# Patient Record
Sex: MALE | Race: WHITE | Employment: OTHER | URBAN - METROPOLITAN AREA
[De-identification: names, ages, dates, MRNs, and addresses within clinical notes are randomized per-mention and may not be internally consistent; named-entity substitution may affect disease eponyms.]

---

## 2022-03-19 PROBLEM — R00.1 BRADYCARDIA: Status: ACTIVE | Noted: 2020-05-18

## 2022-05-24 ENCOUNTER — OFFICE VISIT (OUTPATIENT)
Dept: CARDIOLOGY CLINIC | Age: 87
End: 2022-05-24
Payer: MEDICARE

## 2022-05-24 VITALS
HEIGHT: 74 IN | HEART RATE: 49 BPM | DIASTOLIC BLOOD PRESSURE: 64 MMHG | SYSTOLIC BLOOD PRESSURE: 112 MMHG | WEIGHT: 167 LBS | BODY MASS INDEX: 21.43 KG/M2

## 2022-05-24 DIAGNOSIS — I71.40 ABDOMINAL AORTIC ANEURYSM (AAA) WITHOUT RUPTURE: Primary | ICD-10-CM

## 2022-05-24 DIAGNOSIS — N18.30 CHRONIC RENAL FAILURE, STAGE 3 (MODERATE), UNSPECIFIED WHETHER STAGE 3A OR 3B CKD (HCC): ICD-10-CM

## 2022-05-24 DIAGNOSIS — Z95.1 S/P CABG (CORONARY ARTERY BYPASS GRAFT): ICD-10-CM

## 2022-05-24 DIAGNOSIS — R00.1 BRADYCARDIA: ICD-10-CM

## 2022-05-24 DIAGNOSIS — I49.5 SICK SINUS SYNDROME (HCC): ICD-10-CM

## 2022-05-24 DIAGNOSIS — I70.90 ATHEROSCLEROSIS: ICD-10-CM

## 2022-05-24 DIAGNOSIS — I48.11 LONGSTANDING PERSISTENT ATRIAL FIBRILLATION (HCC): ICD-10-CM

## 2022-05-24 PROCEDURE — G8420 CALC BMI NORM PARAMETERS: HCPCS | Performed by: INTERNAL MEDICINE

## 2022-05-24 PROCEDURE — 93000 ELECTROCARDIOGRAM COMPLETE: CPT | Performed by: INTERNAL MEDICINE

## 2022-05-24 PROCEDURE — G8427 DOCREV CUR MEDS BY ELIG CLIN: HCPCS | Performed by: INTERNAL MEDICINE

## 2022-05-24 PROCEDURE — 99214 OFFICE O/P EST MOD 30 MIN: CPT | Performed by: INTERNAL MEDICINE

## 2022-05-24 ASSESSMENT — ENCOUNTER SYMPTOMS
EYES NEGATIVE: 1
GASTROINTESTINAL NEGATIVE: 1
RESPIRATORY NEGATIVE: 1
ALLERGIC/IMMUNOLOGIC NEGATIVE: 1

## 2022-05-24 NOTE — PROGRESS NOTES
[facility] CARDIOLOGY  [address] [place] [address], [address] [facility], [address] [place] [address]  PHONE: [phone]        22      NAME:  Sharlotte Krabbe  : 1935  MRN: [id number]     Referring Cardiologist: Monae German MD    Reason for Consultation: Atrial fibrillation     ASSESSMENT and PLAN:  Vikram Coronado was seen today for results. Diagnoses and all orders for this visit:    Abdominal aortic aneurysm (HCC)    Longstanding persistent atrial fibrillation (HCC)    Bradycardia    Atherosclerosis    Sick sinus syndrome (HCC)    S/P CABG (coronary artery bypass graft)    Chronic renal failure, stage 3 (moderate), unspecified whether stage 3a or 3b CKD ([facility]    81year old male with permanent AF with bradycardia. He has documented SSS and chronotropic incompetence. He has mild symptoms. He has a class 1 indication for a PPM. He does have a normal EF. We discussed he would be a good candidate for a SC PPM and either a transvenous or MICRA (leadless PPM) would effective. I would favor a leadless device given his reduced risk of longterm infection. He's also had a CABG in the past which does reduce procedural complications from MICRA implantation.     -Symptomatic bradycardia - considering PPM implant. Avoid AV node blockers.  -Persistent AF - continue Eliquis. -EP follow up in 6 months or PRN. -Routine cardiac care per Dr. Israel Lang. Patient has been instructed and agrees to call our office with any issues or other concerns related to their cardiac condition(s) and/or complaint(s). Thank you for allowing me to participate in the electrophysiologic care of Mr. Sharlotte Krabbe. Please contact me if any questions or concerns were to arise. Sara Loomis MD, MS  Clinical Cardiac Electrophysiology  [facility] Cardiology  22  1:04 PM    ===================================================================  Chief Complant:    Chief Complaint   Patient presents with    Results     Echo         Consultation is requested by Angelica Wilson MD for evaluation of Results (Echo )      History:  Rosalind Lowe is a most pleasant 81 y.o. male with a past medical and cardiac history significant for bradycardia and CAD s/p CABG. He is referred by Dr. Carina Mireles. He is known to have bradyardia and describes mild symptoms. He wore a monitor which demonstrated multiple pauses and chronotropic incompetence. The patient otherwise denies chest pain, dyspnea, presyncope, syncope or lateralizing symptoms. Cardiac PMH: (Old records have been reviewed and summarized below)    EKG:  (EKG has been independently visualized by me with interpretation below): Atrial fibrillation, bradycardia, nonspecific QRS widening, normal axis. Monitor: Ziopatch, bradycardia, persistent AF. Multiple 3 second pause episodes. Evidence for chronotropic incompetence. ECHO: 3/2022    Left Ventricle: Left ventricle size is normal. Normal wall thickness. Normal wall motion. Normal left ventricular systolic function. EF by 2D Simpsons Biplane is 62%.   Aortic Valve: Mild sclerosis of the aortic valve cusp. Mild transvalvular regurgitation.   Aorta: Mildly dilated ascending aorta at 3.8 cm.   Bradycardia noted throughout procedure, electrophysiology consultation has been arranged. Previous Heart Catheterization: n/a     Stress Test: n/a     DEVICE INTERROGATION: n/a     Past Medical History, Past Surgical History, Family history, Social History, and Medications were all reviewed with the patient today and updated as necessary. Current Outpatient Medications   Medication Sig Dispense Refill    apixaban (ELIQUIS) 2.5 MG TABS tablet TAKE 1 TAB BY MOUTH TWO (2) TIMES A DAY.       vitamin D (CHOLECALCIFEROL) 25 MCG (1000 UT) TABS tablet Take 1,000 Units by mouth daily      ferrous sulfate (IRON 325) 325 (65 Fe) MG tablet Take by mouth every morning (before breakfast)      fluticasone (FLONASE) 50 MCG/ACT nasal spray 2 sprays by Nasal route daily      simvastatin (ZOCOR) 40 MG tablet Take 40 mg by mouth       No current facility-administered medications for this visit. No Known Allergies    Past Medical History:   Diagnosis Date    Abdominal aortic aneurysm ([facility] 7/25/2016    Angina pectoris ([facility] 7/25/2016    Atherosclerosis 7/25/2016    Atrial fibrillation ([facility] 7/25/2016    Bradycardia 5/18/2020    Chronic renal failure 7/25/2016    Hyperlipidemia 7/25/2016    S/P CABG (coronary artery bypass graft) 7/25/2016    Sick sinus syndrome ([facility] 8/8/2016     Social History     Tobacco Use    Smoking status: Former Smoker    Smokeless tobacco: Never Used   Substance Use Topics    Alcohol use: No       ROS:  A comprehensive review of systems was performed with the pertinent positives and negatives as noted in the HPI in addition to:  Review of Systems   Constitutional: Negative. HENT: Negative. Eyes: Negative. Respiratory: Negative. Cardiovascular: Negative. Gastrointestinal: Negative. Endocrine: Negative. Genitourinary: Negative. Musculoskeletal: Negative. Skin: Negative. Allergic/Immunologic: Negative. Neurological: Negative. Hematological: Negative. Psychiatric/Behavioral: Negative. All other systems reviewed and are negative. PHYSICAL EXAM:   /64   Pulse (!) 49 Comment: per EKG  Ht 6' 2\" (1.88 m)   Wt 167 lb (75.8 kg)   BMI 21.44 kg/m²      Wt Readings from Last 3 Encounters:   05/24/22 167 lb (75.8 kg)   03/11/22 172 lb (78 kg)   02/16/22 172 lb (78 kg)     BP Readings from Last 3 Encounters:   05/24/22 112/64   03/11/22 (!) 102/54   02/16/22 118/62     Gen: Well appearing, well developed, no acute distress  Eyes: Pupils equal, round.  Extraocular movements are intact  ENT: Oropharynx clear, no oral lesions, normal dentition  CV: S1S2, IRIR, bradycardia, no murmurs, rubs or gallops, normal JVD, no carotid bruits, normal distal pulses, no MATHEW  Pulm: Clear to auscultation bilaterally, no accessory muscle uses, no wheezes or rales  GI: Soft, NT, ND, +BS  Neuro: Alert and oriented, nonfocal  Psych: Appropriate affect  Skin: Normal color and skin turgor  MSK: Normal muscle bulk and tone    Medical problems and test results were reviewed with the patient today. No results found for any visits on 05/24/22.

## 2022-05-24 NOTE — PATIENT INSTRUCTIONS
Patient Education        Learning About a Leadless Pacemaker  What is a leadless pacemaker? A leadless pacemaker is a small, battery-powered device. It sends mild electrical signals to your heart. This keeps the heart beating normally. These signals are painless. The pacemaker can help stop the dizziness, fainting, andshortness of breath caused by a slow heart rate. A leadless pacemaker is a newer type of pacemaker. It's called leadless because no wires connect it to the heart. It's placed in the heart's right ventricle, which is one of the heart's lower chambers. Other kinds of pacemakers are placed under the skin of the chest. Those types are connected to the heart bywires. You may feel worried about having a pacemaker. This is common. It can help if you learn about how the pacemaker helps your heart. Talk to your doctor if youhave concerns. How is it put in place? You will get medicine before the procedure. It helps you relax and helpsprevent pain. Your doctor doesn't need to make any cuts to do the procedure. Instead, the doctor uses a thin tube called a catheter. The pacemaker is placed inside the catheter. The doctor puts the catheter into a blood vessel in your groin. Beth Gonzales get a shot to numb the skin where the catheter goes in. Then the doctor moves the catheter through the blood vessel to the right ventricle of your heart. You may feel pressure when the doctor does this. Your doctor may also have injected a dye into your blood vessel and heart. The dye shows up on a screen. It helps your doctor see where to move the catheter andpacemaker. When the catheter is inside the right ventricle, the doctor moves the pacemaker out of the catheter. The pacemaker is attached to the heart tissue so that it doesn't move. Flexible hooks may be used. Then the catheter is removed fromyour body. What can you expect? A leadless pacemaker can help you return to a more normal, more active life.   You'll need to use certain electric devices with caution. Some devices have a strong electromagnetic field. This field can keep your pacemaker from working right for a short time. These devices include things in your home, garage, or workplace. Check with your doctor about what you need to avoid and what you need to keep a short distance away from your pacemaker. Many household andoffice electronics do not affect your pacemaker. Your doctor will tell you how often your pacemaker should be checked. This isto make sure that it's working right. The battery life of a leadless pacemaker is up to 10 years or more. Its strength will be checked at follow-up visits. The battery can't be recharged. You will need a new pacemaker when the charge is too low. Follow-up care is a key part of your treatment and safety. Be sure to make and go to all appointments, and call your doctor if you are having problems. It's also a good idea to know your test results and keep alist of the medicines you take. Current as of: January 10, 2022               Content Version: 13.2  © 2006-2022 [employer], [facility]. Care instructions adapted under license by [facility] ([facility]). If you have questions about a medical condition or this instruction, always ask your healthcare professional. [place] [address] any warranty or liability for your use of this information.

## 2022-08-22 ENCOUNTER — OFFICE VISIT (OUTPATIENT)
Dept: CARDIOLOGY CLINIC | Age: 87
End: 2022-08-22
Payer: MEDICARE

## 2022-08-22 VITALS
HEART RATE: 50 BPM | BODY MASS INDEX: 21.92 KG/M2 | DIASTOLIC BLOOD PRESSURE: 64 MMHG | WEIGHT: 170.8 LBS | HEIGHT: 74 IN | SYSTOLIC BLOOD PRESSURE: 120 MMHG

## 2022-08-22 DIAGNOSIS — R00.1 BRADYCARDIA: ICD-10-CM

## 2022-08-22 DIAGNOSIS — I48.21 PERMANENT ATRIAL FIBRILLATION (HCC): Primary | ICD-10-CM

## 2022-08-22 DIAGNOSIS — Z95.1 S/P CABG (CORONARY ARTERY BYPASS GRAFT): ICD-10-CM

## 2022-08-22 PROCEDURE — G8420 CALC BMI NORM PARAMETERS: HCPCS | Performed by: INTERNAL MEDICINE

## 2022-08-22 PROCEDURE — 99214 OFFICE O/P EST MOD 30 MIN: CPT | Performed by: INTERNAL MEDICINE

## 2022-08-22 PROCEDURE — 1123F ACP DISCUSS/DSCN MKR DOCD: CPT | Performed by: INTERNAL MEDICINE

## 2022-08-22 PROCEDURE — 1036F TOBACCO NON-USER: CPT | Performed by: INTERNAL MEDICINE

## 2022-08-22 PROCEDURE — G8427 DOCREV CUR MEDS BY ELIG CLIN: HCPCS | Performed by: INTERNAL MEDICINE

## 2022-08-22 ASSESSMENT — ENCOUNTER SYMPTOMS
COUGH: 0
ORTHOPNEA: 0
SHORTNESS OF BREATH: 0
WHEEZING: 0
HEMATOCHEZIA: 0
HEMATEMESIS: 0
NAUSEA: 0
VOMITING: 0
COLOR CHANGE: 0
ABDOMINAL PAIN: 0
DIARRHEA: 0
BOWEL INCONTINENCE: 0
BLURRED VISION: 0
SPUTUM PRODUCTION: 0
HOARSE VOICE: 0

## 2022-08-22 NOTE — PROGRESS NOTES
[facility] CARDIOLOGY  [address], [address] [facility], [address] [place] [address]  PHONE: [phone]        22        NAME:  Loli Garvin  : 1935  MRN: [id number]       SUBJECTIVE:   Loli Garvin is a 81 y.o. male seen for a follow up visit regarding the following: SSS,AF,CAD,and CABG. He was seen by EP due to chronotropic incompetency and PPM was recommended. He did not undergo pacemaker implant. He returns for scheduled follow up and reports doing well. Chief Complaint   Patient presents with    Atrial Fibrillation     6 month follow up       HPI:    Palpitations   This is a chronic problem. The problem occurs rarely. The problem has been unchanged. Nothing aggravates the symptoms. Pertinent negatives include no anxiety, chest fullness, chest pain, coughing, diaphoresis, dizziness, fever, irregular heartbeat, malaise/fatigue, nausea, near-syncope, numbness, shortness of breath, syncope, vomiting or weakness. Past Medical History, Past Surgical History, Family history, Social History, and Medications were all reviewed with the patient today and updated as necessary.          Current Outpatient Medications:     apixaban (ELIQUIS) 2.5 MG TABS tablet, TAKE 1 TAB BY MOUTH TWO (2) TIMES A DAY., Disp: , Rfl:     vitamin D (CHOLECALCIFEROL) 25 MCG (1000 UT) TABS tablet, Take 1,000 Units by mouth daily, Disp: , Rfl:     ferrous sulfate (IRON 325) 325 (65 Fe) MG tablet, Take by mouth every morning (before breakfast), Disp: , Rfl:     fluticasone (FLONASE) 50 MCG/ACT nasal spray, 2 sprays by Nasal route daily, Disp: , Rfl:     simvastatin (ZOCOR) 40 MG tablet, Take 40 mg by mouth, Disp: , Rfl:   No Known Allergies  Past Medical History:   Diagnosis Date    Abdominal aortic aneurysm ([facility] 2016    Angina pectoris ([facility] 2016    Atherosclerosis 2016    Atrial fibrillation ([facility] 2016    Bradycardia 2020    Chronic renal failure 2016    Hyperlipidemia 2016    S/P CABG (coronary artery bypass graft) 7/25/2016    Sick sinus syndrome ([facility] 8/8/2016     No past surgical history on file. No family history on file. Social History     Tobacco Use    Smoking status: Former    Smokeless tobacco: Never   Substance Use Topics    Alcohol use: No       ROS:    Review of Systems   Constitutional: Negative for chills, decreased appetite, diaphoresis, fever and malaise/fatigue. HENT:  Negative for congestion, hearing loss, hoarse voice and nosebleeds. Eyes:  Negative for blurred vision. Cardiovascular:  Positive for palpitations. Negative for chest pain, claudication, cyanosis, dyspnea on exertion, irregular heartbeat, leg swelling, near-syncope, orthopnea, paroxysmal nocturnal dyspnea and syncope. Respiratory:  Negative for cough, shortness of breath, sputum production and wheezing. Endocrine: Negative for polydipsia, polyphagia and polyuria. Skin:  Negative for color change. Gastrointestinal:  Negative for abdominal pain, bowel incontinence, diarrhea, hematemesis, hematochezia, nausea and vomiting. Genitourinary:  Negative for dysuria, frequency and hematuria. Neurological:  Negative for dizziness, focal weakness, light-headedness, loss of balance, numbness, sensory change and weakness. Psychiatric/Behavioral:  Negative for altered mental status and memory loss. The patient is not nervous/anxious. PHYSICAL EXAM:   /64   Pulse 50   Ht 6' 2\" (1.88 m)   Wt 170 lb 12.8 oz (77.5 kg)   BMI 21.93 kg/m²      Physical Exam  Constitutional:       Appearance: Normal appearance. HENT:      Head: Normocephalic and atraumatic. Nose: Nose normal.   Eyes:      Extraocular Movements: Extraocular movements intact. Pupils: Pupils are equal, round, and reactive to light. Neck:      Vascular: No carotid bruit. Cardiovascular:      Rate and Rhythm: Rhythm irregular. Pulses: Normal pulses. Heart sounds: No murmur heard.   Pulmonary:      Effort: Pulmonary effort is normal.      Breath sounds: Normal breath sounds. Abdominal:      General: Abdomen is flat. Bowel sounds are normal.      Palpations: Abdomen is soft. Musculoskeletal:         General: Normal range of motion. Cervical back: Normal range of motion and neck supple. Skin:     General: Skin is warm and dry. Neurological:      General: No focal deficit present. Mental Status: He is alert and oriented to person, place, and time. Psychiatric:         Mood and Affect: Mood normal.       Medical problems and test results were reviewed with the patient today. No results found for this or any previous visit (from the past 672 hour(s)). No results found for: CHOL, CHOLPOCT, CHOLX, CHLST, CHOLV, HDL, HDLPOC, HDLC, LDL, LDLC, VLDLC, VLDL, TGLX, TRIGL  No results found for any visits on 08/22/22. ASSESSMENT and PLAN    Ace Curry was seen today for atrial fibrillation. Diagnoses and all orders for this visit:    Permanent atrial fibrillation (HCC):Stable. Continue Eliquis. Bradycardia:Asymptomatic. S/P CABG (coronary artery bypass graft):No angina. Continue Zocor. Avoiding BB due to SSS. Disposition:    Return in about 6 months (around 2/22/2023).                 Cookie Maloney MD  8/22/2022  11:41 AM

## 2023-01-09 ENCOUNTER — OFFICE VISIT (OUTPATIENT)
Dept: CARDIOLOGY CLINIC | Age: 88
End: 2023-01-09
Payer: MEDICARE

## 2023-01-09 VITALS
HEART RATE: 51 BPM | BODY MASS INDEX: 22.64 KG/M2 | DIASTOLIC BLOOD PRESSURE: 69 MMHG | WEIGHT: 176.4 LBS | HEIGHT: 74 IN | SYSTOLIC BLOOD PRESSURE: 127 MMHG

## 2023-01-09 DIAGNOSIS — I48.11 LONGSTANDING PERSISTENT ATRIAL FIBRILLATION (HCC): Primary | ICD-10-CM

## 2023-01-09 DIAGNOSIS — I20.9 ANGINA PECTORIS (HCC): ICD-10-CM

## 2023-01-09 PROCEDURE — 93000 ELECTROCARDIOGRAM COMPLETE: CPT | Performed by: INTERNAL MEDICINE

## 2023-01-09 PROCEDURE — G8420 CALC BMI NORM PARAMETERS: HCPCS | Performed by: INTERNAL MEDICINE

## 2023-01-09 PROCEDURE — G8484 FLU IMMUNIZE NO ADMIN: HCPCS | Performed by: INTERNAL MEDICINE

## 2023-01-09 PROCEDURE — 1123F ACP DISCUSS/DSCN MKR DOCD: CPT | Performed by: INTERNAL MEDICINE

## 2023-01-09 PROCEDURE — 99214 OFFICE O/P EST MOD 30 MIN: CPT | Performed by: INTERNAL MEDICINE

## 2023-01-09 PROCEDURE — G8427 DOCREV CUR MEDS BY ELIG CLIN: HCPCS | Performed by: INTERNAL MEDICINE

## 2023-01-09 PROCEDURE — 1036F TOBACCO NON-USER: CPT | Performed by: INTERNAL MEDICINE

## 2023-01-09 ASSESSMENT — ENCOUNTER SYMPTOMS
ALLERGIC/IMMUNOLOGIC NEGATIVE: 1
RESPIRATORY NEGATIVE: 1
EYES NEGATIVE: 1
GASTROINTESTINAL NEGATIVE: 1

## 2023-01-09 NOTE — PROGRESS NOTES
[facility] CARDIOLOGY  [address] [place] [address], [address] [facility], [address] [place] [address]  PHONE: [phone]        23      NAME:  Cruz Venegas  : 1935  MRN: [id number]     Referring Cardiologist: Wayne Lao MD    Reason for Consultation: Atrial fibrillation     ASSESSMENT and PLAN:  Massimo Moreno was seen today for results. Diagnoses and all orders for this visit:    Abdominal aortic aneurysm (HCC)    Longstanding persistent atrial fibrillation (HCC)    Bradycardia    Atherosclerosis    Sick sinus syndrome (HCC)    S/P CABG (coronary artery bypass graft)    Chronic renal failure, stage 3 (moderate), unspecified whether stage 3a or 3b CKD ([facility]    81year old male with permanent AF with bradycardia. He has documented SSS and chronotropic incompetence. He has mild symptoms. He has a class 1 indication for a PPM. He does have a normal EF. We discussed he would be a good candidate for a SC PPM and either a transvenous or MICRA (leadless PPM) would effective. I would favor a leadless device given his reduced risk of longterm infection. He's also had a CABG in the past which does reduce procedural complications from MICRA implantation.     -Symptomatic bradycardia - considering PPM implant. Avoid AV node blockers.  -Persistent AF - continue Eliquis. -EP follow up in 6 months or PRN. -Routine cardiac care per Dr. Ottoniel Hendrix. Patient has been instructed and agrees to call our office with any issues or other concerns related to their cardiac condition(s) and/or complaint(s). Thank you for allowing me to participate in the electrophysiologic care of Mr. Cruz Venegas. Please contact me if any questions or concerns were to arise. Zack Loomis MD, MS  Clinical Cardiac Electrophysiology  [facility] Cardiology  23  8:36 AM    ===================================================================  Chief Complant:    Chief Complaint   Patient presents with    Irregular Heart Beat    Hyperlipidemia    Atrial Fibrillation        Consultation is requested by Yolanda Klinefelter, MD for evaluation of Irregular Heart Beat, Hyperlipidemia, and Atrial Fibrillation    History:  Kwadwo Dunbar is a most pleasant 81 y.o. male with a past medical and cardiac history significant for bradycardia and CAD s/p CABG. He is referred by Dr. Nivia Mcnair. He is known to have bradyardia and describes mild symptoms. He wore a monitor which demonstrated multiple pauses and chronotropic incompetence. He comes in for follow up. He's mostly felt well without significant CV symptoms. The patient otherwise denies chest pain, dyspnea, presyncope, syncope or lateralizing symptoms. Cardiac PMH: (Old records have been reviewed and summarized below)    EKG:  (EKG has been independently visualized by me with interpretation below): Atrial fibrillation, bradycardia, nonspecific QRS widening, normal axis. Monitor: Ziopatch, bradycardia, persistent AF. Multiple 3 second pause episodes. Evidence for chronotropic incompetence. ECHO: 3/2022    Left Ventricle: Left ventricle size is normal. Normal wall thickness. Normal wall motion. Normal left ventricular systolic function. EF by 2D Simpsons Biplane is 62%. Aortic Valve: Mild sclerosis of the aortic valve cusp. Mild transvalvular regurgitation. Aorta: Mildly dilated ascending aorta at 3.8 cm. Bradycardia noted throughout procedure, electrophysiology consultation has been arranged. Previous Heart Catheterization: n/a     Stress Test: n/a     DEVICE INTERROGATION: n/a     Past Medical History, Past Surgical History, Family history, Social History, and Medications were all reviewed with the patient today and updated as necessary. Current Outpatient Medications   Medication Sig Dispense Refill    Multiple Vitamin (MULTIVITAMIN ADULT PO) Take by mouth      apixaban (ELIQUIS) 2.5 MG TABS tablet TAKE 1 TAB BY MOUTH TWO (2) TIMES A DAY.       vitamin D (CHOLECALCIFEROL) 25 MCG (1000 UT) TABS tablet Take 1,000 Units by mouth daily      ferrous sulfate (IRON 325) 325 (65 Fe) MG tablet Take by mouth every morning (before breakfast)      fluticasone (FLONASE) 50 MCG/ACT nasal spray 2 sprays by Nasal route daily      simvastatin (ZOCOR) 40 MG tablet Take 40 mg by mouth       No current facility-administered medications for this visit. No Known Allergies    Past Medical History:   Diagnosis Date    Abdominal aortic aneurysm 7/25/2016    [facility] 7/25/2016    Atherosclerosis 7/25/2016    Atrial fibrillation ([facility] 7/25/2016    Bradycardia 5/18/2020    Chronic renal failure 7/25/2016    Hyperlipidemia 7/25/2016    S/P CABG (coronary artery bypass graft) 7/25/2016    Sick sinus syndrome ([facility] 8/8/2016     Social History     Tobacco Use    Smoking status: Former    Smokeless tobacco: Never   Substance Use Topics    Alcohol use: No       ROS:  A comprehensive review of systems was performed with the pertinent positives and negatives as noted in the HPI in addition to:  Review of Systems   Constitutional: Negative. HENT: Negative. Eyes: Negative. Respiratory: Negative. Cardiovascular: Negative. Gastrointestinal: Negative. Endocrine: Negative. Genitourinary: Negative. Musculoskeletal: Negative. Skin: Negative. Allergic/Immunologic: Negative. Neurological: Negative. Hematological: Negative. Psychiatric/Behavioral: Negative. All other systems reviewed and are negative. PHYSICAL EXAM:   /69   Pulse 51   Ht 6' 2\" (1.88 m)   Wt 176 lb 6.4 oz (80 kg)   BMI 22.65 kg/m²      Wt Readings from Last 3 Encounters:   01/09/23 176 lb 6.4 oz (80 kg)   08/22/22 170 lb 12.8 oz (77.5 kg)   05/24/22 167 lb (75.8 kg)     BP Readings from Last 3 Encounters:   01/09/23 127/69   08/22/22 120/64   05/24/22 112/64     Gen: Well appearing, well developed, no acute distress  Eyes: Pupils equal, round.  Extraocular movements are intact  ENT: Oropharynx clear, no oral lesions, normal dentition  CV: S1S2, IRIR, bradycardia, no murmurs, rubs or gallops, normal JVD, no carotid bruits, normal distal pulses, no MATHEW  Pulm: Clear to auscultation bilaterally, no accessory muscle uses, no wheezes or rales  GI: Soft, NT, ND, +BS  Neuro: Alert and oriented, nonfocal  Psych: Appropriate affect  Skin: Normal color and skin turgor  MSK: Normal muscle bulk and tone    Medical problems and test results were reviewed with the patient today. No results found for any visits on 01/09/23.

## 2023-02-13 ENCOUNTER — OFFICE VISIT (OUTPATIENT)
Dept: CARDIOLOGY CLINIC | Age: 88
End: 2023-02-13
Payer: MEDICARE

## 2023-02-13 VITALS
DIASTOLIC BLOOD PRESSURE: 56 MMHG | HEIGHT: 74 IN | BODY MASS INDEX: 22.59 KG/M2 | HEART RATE: 56 BPM | WEIGHT: 176 LBS | SYSTOLIC BLOOD PRESSURE: 108 MMHG

## 2023-02-13 DIAGNOSIS — I48.21 PERMANENT ATRIAL FIBRILLATION (HCC): ICD-10-CM

## 2023-02-13 DIAGNOSIS — Z95.1 S/P CABG (CORONARY ARTERY BYPASS GRAFT): ICD-10-CM

## 2023-02-13 DIAGNOSIS — I49.5 SICK SINUS SYNDROME (HCC): Primary | ICD-10-CM

## 2023-02-13 DIAGNOSIS — E78.5 HYPERLIPIDEMIA, UNSPECIFIED HYPERLIPIDEMIA TYPE: ICD-10-CM

## 2023-02-13 PROCEDURE — 1123F ACP DISCUSS/DSCN MKR DOCD: CPT | Performed by: INTERNAL MEDICINE

## 2023-02-13 PROCEDURE — G8420 CALC BMI NORM PARAMETERS: HCPCS | Performed by: INTERNAL MEDICINE

## 2023-02-13 PROCEDURE — G8427 DOCREV CUR MEDS BY ELIG CLIN: HCPCS | Performed by: INTERNAL MEDICINE

## 2023-02-13 PROCEDURE — 1036F TOBACCO NON-USER: CPT | Performed by: INTERNAL MEDICINE

## 2023-02-13 PROCEDURE — 99214 OFFICE O/P EST MOD 30 MIN: CPT | Performed by: INTERNAL MEDICINE

## 2023-02-13 PROCEDURE — G8484 FLU IMMUNIZE NO ADMIN: HCPCS | Performed by: INTERNAL MEDICINE

## 2023-02-13 RX ORDER — SIMVASTATIN 40 MG
40 TABLET ORAL NIGHTLY
Qty: 90 TABLET | Refills: 3 | Status: SHIPPED | OUTPATIENT
Start: 2023-02-13

## 2023-02-13 ASSESSMENT — ENCOUNTER SYMPTOMS
ABDOMINAL PAIN: 0
WHEEZING: 0
SPUTUM PRODUCTION: 0
DIARRHEA: 0
BOWEL INCONTINENCE: 0
HEMATEMESIS: 0
HEMATOCHEZIA: 0
SHORTNESS OF BREATH: 0
ORTHOPNEA: 0
COLOR CHANGE: 0
BLURRED VISION: 0
HOARSE VOICE: 0

## 2023-02-13 NOTE — PROGRESS NOTES
[address], [address]  PHONE: [phone]        23        NAME:  Jett Olvera  : 1935  MRN: [id number]       SUBJECTIVE:   Jett Olvera is a 81 y.o. male seen for a follow up visit regarding the following: SSS,AF,CAD, chronotropic incompetence,and hx CABG. He returns for scheduled follow up. In the past,he has declined recommended pacemaker. He continues to feel well with no complaints. Chief Complaint   Patient presents with    Atrial Fibrillation     6 month follow up       HPI:    Atrial Fibrillation  Presents for follow-up visit. Symptoms include bradycardia. Symptoms are negative for an AICD problem, chest pain, dizziness, hemodynamic instability, hypertension, hypotension, pacemaker problem, palpitations, shortness of breath, syncope and weakness. The symptoms have been stable. Past Medical History, Past Surgical History, Family history, Social History, and Medications were all reviewed with the patient today and updated as necessary.          Current Outpatient Medications:     apixaban (ELIQUIS) 2.5 MG TABS tablet, Take 1 tablet by mouth 2 times daily TAKE 1 TAB BY MOUTH TWO (2) TIMES A DAY., Disp: 180 tablet, Rfl: 3    simvastatin (ZOCOR) 40 MG tablet, Take 1 tablet by mouth nightly, Disp: 90 tablet, Rfl: 3    Multiple Vitamin (MULTIVITAMIN ADULT PO), Take by mouth, Disp: , Rfl:     vitamin D (CHOLECALCIFEROL) 25 MCG (1000 UT) TABS tablet, Take 1,000 Units by mouth daily, Disp: , Rfl:     ferrous sulfate (IRON 325) 325 (65 Fe) MG tablet, Take by mouth every morning (before breakfast), Disp: , Rfl:     fluticasone (FLONASE) 50 MCG/ACT nasal spray, 2 sprays by Nasal route daily, Disp: , Rfl:   No Known Allergies  Past Medical History:   Diagnosis Date    Abdominal aortic aneurysm 2016    Angina pectoris ([facility] 2016    Atherosclerosis 2016    Atrial fibrillation ([facility] 2016    Bradycardia 2020    Chronic renal failure 7/25/2016    Hyperlipidemia 7/25/2016    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  No family history on file.   Social History     Tobacco Use    Smoking status: Former    Smokeless tobacco: Never   Substance Use Topics    Alcohol use: No       ROS:    Review of Systems   Constitutional: Negative for chills, decreased appetite, diaphoresis, fever and malaise/fatigue.   HENT:  Negative for congestion, hearing loss, hoarse voice and nosebleeds.    Eyes:  Negative for blurred vision.   Cardiovascular:  Negative for chest pain, claudication, cyanosis, dyspnea on exertion, irregular heartbeat, leg swelling, near-syncope, orthopnea, palpitations, paroxysmal nocturnal dyspnea and syncope.   Respiratory:  Negative for shortness of breath, sputum production and wheezing.    Endocrine: Negative for polydipsia, polyphagia and polyuria.   Skin:  Negative for color change.   Gastrointestinal:  Negative for abdominal pain, bowel incontinence, diarrhea, hematemesis and hematochezia.   Genitourinary:  Negative for dysuria, frequency and hematuria.   Neurological:  Negative for dizziness, focal weakness, light-headedness, loss of balance, numbness, sensory change and weakness.   Psychiatric/Behavioral:  Negative for altered mental status and memory loss.          PHYSICAL EXAM:   BP (!) 108/56   Pulse 56   Ht 6' 2\" (1.88 m)   Wt 176 lb (79.8 kg)   BMI 22.60 kg/m²      Physical Exam  Constitutional:       Appearance: Normal appearance.   HENT:      Head: Normocephalic and atraumatic.      Nose: Nose normal.   Eyes:      Extraocular Movements: Extraocular movements intact.      Pupils: Pupils are equal, round, and reactive to light.   Neck:      Vascular: No carotid bruit.   Cardiovascular:      Rate and Rhythm: Rhythm irregular.      Pulses: Normal pulses.      Heart sounds: No murmur heard.  Pulmonary:      Effort: Pulmonary effort is  normal.      Breath sounds: Normal breath sounds. Abdominal:      General: Abdomen is flat. Bowel sounds are normal.      Palpations: Abdomen is soft. Musculoskeletal:         General: Normal range of motion. Cervical back: Normal range of motion and neck supple. Skin:     General: Skin is warm and dry. Neurological:      General: No focal deficit present. Mental Status: He is alert and oriented to person, place, and time. Psychiatric:         Mood and Affect: Mood normal.       Medical problems and test results were reviewed with the patient today. No results found for this or any previous visit (from the past 672 hour(s)). No results found for: CHOL, CHOLPOCT, CHOLX, CHLST, CHOLV, HDL, HDLPOC, HDLC, LDL, LDLC, VLDLC, VLDL, TGLX, TRIGL  No results found for any visits on 02/13/23. ASSESSMENT and PLAN    Ingrid Dubin was seen today for atrial fibrillation. Diagnoses and all orders for this visit:    Sick sinus syndrome (HCC):He continues to decline pacemaker. Permanent atrial fibrillation (HCC):Stable. Continue NOAC. -     apixaban (ELIQUIS) 2.5 MG TABS tablet; Take 1 tablet by mouth 2 times daily TAKE 1 TAB BY MOUTH TWO (2) TIMES A DAY. Hyperlipidemia, unspecified hyperlipidemia type:Stable. Continue Zocor. Lipid panel in 6 months. Lipid panel on 10-26-22 reported ND=[id number] with an LDL=73 which is close to goal.  -     simvastatin (ZOCOR) 40 MG tablet; Take 1 tablet by mouth nightly    S/P CABG (coronary artery bypass graft):Asymptomatic. Avoiding BB due to bradycardia. Continue statin. Disposition:    Return in about 6 months (around 8/13/2023).                 Ilsa Kendall MD  2/13/2023  11:12 AM

## 2023-08-17 ENCOUNTER — OFFICE VISIT (OUTPATIENT)
Age: 88
End: 2023-08-17
Payer: MEDICARE

## 2023-08-17 VITALS
HEIGHT: 74 IN | SYSTOLIC BLOOD PRESSURE: 98 MMHG | BODY MASS INDEX: 23.2 KG/M2 | WEIGHT: 180.8 LBS | HEART RATE: 48 BPM | DIASTOLIC BLOOD PRESSURE: 50 MMHG

## 2023-08-17 DIAGNOSIS — E78.5 HYPERLIPIDEMIA, UNSPECIFIED HYPERLIPIDEMIA TYPE: ICD-10-CM

## 2023-08-17 DIAGNOSIS — I65.23 BILATERAL CAROTID ARTERY STENOSIS: Primary | ICD-10-CM

## 2023-08-17 DIAGNOSIS — I48.21 PERMANENT ATRIAL FIBRILLATION (HCC): ICD-10-CM

## 2023-08-17 PROCEDURE — 1123F ACP DISCUSS/DSCN MKR DOCD: CPT | Performed by: INTERNAL MEDICINE

## 2023-08-17 PROCEDURE — 99214 OFFICE O/P EST MOD 30 MIN: CPT | Performed by: INTERNAL MEDICINE

## 2023-08-17 PROCEDURE — 1036F TOBACCO NON-USER: CPT | Performed by: INTERNAL MEDICINE

## 2023-08-17 PROCEDURE — G8420 CALC BMI NORM PARAMETERS: HCPCS | Performed by: INTERNAL MEDICINE

## 2023-08-17 PROCEDURE — G8427 DOCREV CUR MEDS BY ELIG CLIN: HCPCS | Performed by: INTERNAL MEDICINE

## 2023-08-17 RX ORDER — SIMVASTATIN 40 MG
40 TABLET ORAL NIGHTLY
Qty: 90 TABLET | Refills: 3 | Status: SHIPPED | OUTPATIENT
Start: 2023-08-17

## 2023-08-17 ASSESSMENT — ENCOUNTER SYMPTOMS
WHEEZING: 0
COLOR CHANGE: 0
DIARRHEA: 0
ABDOMINAL PAIN: 0
BOWEL INCONTINENCE: 0
ORTHOPNEA: 0
SHORTNESS OF BREATH: 0
BLURRED VISION: 0
HEMATEMESIS: 0
HOARSE VOICE: 0
HEMATOCHEZIA: 0
SPUTUM PRODUCTION: 0

## 2024-02-29 ENCOUNTER — OFFICE VISIT (OUTPATIENT)
Age: 89
End: 2024-02-29
Payer: MEDICARE

## 2024-02-29 VITALS
HEART RATE: 38 BPM | SYSTOLIC BLOOD PRESSURE: 138 MMHG | HEIGHT: 74 IN | BODY MASS INDEX: 23.87 KG/M2 | DIASTOLIC BLOOD PRESSURE: 60 MMHG | WEIGHT: 186 LBS

## 2024-02-29 DIAGNOSIS — I49.5 SICK SINUS SYNDROME (HCC): ICD-10-CM

## 2024-02-29 DIAGNOSIS — I65.23 BILATERAL CAROTID ARTERY STENOSIS: ICD-10-CM

## 2024-02-29 DIAGNOSIS — I48.21 PERMANENT ATRIAL FIBRILLATION (HCC): Primary | ICD-10-CM

## 2024-02-29 PROCEDURE — G8420 CALC BMI NORM PARAMETERS: HCPCS | Performed by: INTERNAL MEDICINE

## 2024-02-29 PROCEDURE — 1036F TOBACCO NON-USER: CPT | Performed by: INTERNAL MEDICINE

## 2024-02-29 PROCEDURE — G8427 DOCREV CUR MEDS BY ELIG CLIN: HCPCS | Performed by: INTERNAL MEDICINE

## 2024-02-29 PROCEDURE — G8484 FLU IMMUNIZE NO ADMIN: HCPCS | Performed by: INTERNAL MEDICINE

## 2024-02-29 PROCEDURE — 93000 ELECTROCARDIOGRAM COMPLETE: CPT | Performed by: INTERNAL MEDICINE

## 2024-02-29 PROCEDURE — 1123F ACP DISCUSS/DSCN MKR DOCD: CPT | Performed by: INTERNAL MEDICINE

## 2024-02-29 PROCEDURE — 99214 OFFICE O/P EST MOD 30 MIN: CPT | Performed by: INTERNAL MEDICINE

## 2024-02-29 RX ORDER — ASPIRIN 81 MG/1
81 TABLET ORAL DAILY
COMMUNITY

## 2024-02-29 ASSESSMENT — ENCOUNTER SYMPTOMS
HOARSE VOICE: 0
WHEEZING: 0
SPUTUM PRODUCTION: 0
HEMATEMESIS: 0
BOWEL INCONTINENCE: 0
HEMATOCHEZIA: 0
COLOR CHANGE: 0
BLURRED VISION: 0
ORTHOPNEA: 0
DIARRHEA: 0
SHORTNESS OF BREATH: 0
ABDOMINAL PAIN: 0

## 2024-02-29 NOTE — PROGRESS NOTES
[facility] CARDIOLOGY  [address] [place] [address], [facility] [address]  [place] [address]  PHONE: [phone]        24        NAME:  Dallas Arzola  : 1935  MRN: [id number]       SUBJECTIVE:   Dallas Arzola is a 89 y.o. male seen for a follow up visit regarding the following: SSS,AF,CAD,and carotid artery disease.He has declined recommended pacemaker implant for SSS in the past.He returns for scheduled follow up.He continues to report doing well with no complaints.    Chief Complaint   Patient presents with    Atrial Fibrillation       HPI:    Atrial Fibrillation  Presents for follow-up visit. Symptoms include bradycardia. Symptoms are negative for an AICD problem, chest pain, dizziness, hemodynamic instability, hypertension, hypotension, pacemaker problem, palpitations, shortness of breath, syncope, tachycardia and weakness. The symptoms have been stable.       Past Medical History, Past Surgical History, Family history, Social History, and Medications were all reviewed with the patient today and updated as necessary.         Current Outpatient Medications:     aspirin 81 MG EC tablet, Take 1 tablet by mouth daily, Disp: , Rfl:     apixaban (ELIQUIS) 2.5 MG TABS tablet, Take 1 tablet by mouth 2 times daily TAKE 1 TAB BY MOUTH TWO (2) TIMES A DAY., Disp: 180 tablet, Rfl: 3    simvastatin (ZOCOR) 40 MG tablet, Take 1 tablet by mouth nightly, Disp: 90 tablet, Rfl: 3    Multiple Vitamin (MULTIVITAMIN ADULT PO), Take by mouth, Disp: , Rfl:     vitamin D (CHOLECALCIFEROL) 25 MCG (1000 UT) TABS tablet, Take 1 tablet by mouth daily, Disp: , Rfl:     ferrous sulfate (IRON 325) 325 (65 Fe) MG tablet, Take by mouth every morning (before breakfast), Disp: , Rfl:     fluticasone (FLONASE) 50 MCG/ACT nasal spray, 2 sprays by Nasal route daily, Disp: , Rfl:   No Known Allergies  Past Medical History:   Diagnosis Date    Abdominal aortic aneurysm (HCC) 2016    Angina pectoris (HCC) 2016    Atherosclerosis 2016  The dose should be 5 mg per day.  Prescription sent.

## 2024-08-29 NOTE — TELEPHONE ENCOUNTER
Patient has been on Simvastatin and at a recent hospital stay they changed that to Lipitor .He has 2 weeks left on the lipitor and is asking if he should go back on the Simvastatin.

## 2024-08-30 RX ORDER — ATORVASTATIN CALCIUM 20 MG/1
20 TABLET, FILM COATED ORAL DAILY
COMMUNITY
End: 2024-08-30 | Stop reason: SDUPTHER

## 2024-08-30 RX ORDER — ATORVASTATIN CALCIUM 10 MG/1
10 TABLET, FILM COATED ORAL DAILY
COMMUNITY
End: 2024-08-30 | Stop reason: ALTCHOICE

## 2024-08-30 RX ORDER — ATORVASTATIN CALCIUM 20 MG/1
20 TABLET, FILM COATED ORAL DAILY
Qty: 90 TABLET | Refills: 3 | Status: SHIPPED | OUTPATIENT
Start: 2024-08-30

## 2024-09-19 ENCOUNTER — OFFICE VISIT (OUTPATIENT)
Age: 89
End: 2024-09-19
Payer: MEDICARE

## 2024-09-19 VITALS
HEART RATE: 68 BPM | DIASTOLIC BLOOD PRESSURE: 70 MMHG | BODY MASS INDEX: 22.46 KG/M2 | HEIGHT: 74 IN | SYSTOLIC BLOOD PRESSURE: 136 MMHG | WEIGHT: 175 LBS

## 2024-09-19 DIAGNOSIS — E78.5 HYPERLIPIDEMIA, UNSPECIFIED HYPERLIPIDEMIA TYPE: ICD-10-CM

## 2024-09-19 DIAGNOSIS — I48.21 PERMANENT ATRIAL FIBRILLATION (HCC): ICD-10-CM

## 2024-09-19 DIAGNOSIS — I25.810 CORONARY ARTERY DISEASE INVOLVING CORONARY BYPASS GRAFT OF NATIVE HEART WITHOUT ANGINA PECTORIS: ICD-10-CM

## 2024-09-19 DIAGNOSIS — I49.5 SICK SINUS SYNDROME (HCC): Primary | ICD-10-CM

## 2024-09-19 PROCEDURE — G8420 CALC BMI NORM PARAMETERS: HCPCS | Performed by: INTERNAL MEDICINE

## 2024-09-19 PROCEDURE — 1123F ACP DISCUSS/DSCN MKR DOCD: CPT | Performed by: INTERNAL MEDICINE

## 2024-09-19 PROCEDURE — 99214 OFFICE O/P EST MOD 30 MIN: CPT | Performed by: INTERNAL MEDICINE

## 2024-09-19 PROCEDURE — 1036F TOBACCO NON-USER: CPT | Performed by: INTERNAL MEDICINE

## 2024-09-19 PROCEDURE — G8427 DOCREV CUR MEDS BY ELIG CLIN: HCPCS | Performed by: INTERNAL MEDICINE

## 2024-09-19 RX ORDER — ASCORBIC ACID 500 MG
500 TABLET ORAL DAILY
COMMUNITY

## 2024-09-19 ASSESSMENT — ENCOUNTER SYMPTOMS
ABDOMINAL PAIN: 0
DIARRHEA: 0
HOARSE VOICE: 0
WHEEZING: 0
HEMATEMESIS: 0
BLURRED VISION: 0
SPUTUM PRODUCTION: 0
HEMATOCHEZIA: 0
SHORTNESS OF BREATH: 0
ORTHOPNEA: 0
COLOR CHANGE: 0
BOWEL INCONTINENCE: 0